# Patient Record
Sex: MALE | Race: OTHER
[De-identification: names, ages, dates, MRNs, and addresses within clinical notes are randomized per-mention and may not be internally consistent; named-entity substitution may affect disease eponyms.]

---

## 2020-04-13 PROBLEM — Z00.00 ENCOUNTER FOR PREVENTIVE HEALTH EXAMINATION: Status: ACTIVE | Noted: 2020-04-13

## 2020-04-14 ENCOUNTER — APPOINTMENT (OUTPATIENT)
Dept: ORTHOPEDIC SURGERY | Facility: CLINIC | Age: 45
End: 2020-04-14
Payer: MEDICAID

## 2020-04-14 VITALS
HEIGHT: 66 IN | BODY MASS INDEX: 28.93 KG/M2 | SYSTOLIC BLOOD PRESSURE: 127 MMHG | DIASTOLIC BLOOD PRESSURE: 77 MMHG | WEIGHT: 180 LBS | HEART RATE: 82 BPM

## 2020-04-14 PROCEDURE — 99203 OFFICE O/P NEW LOW 30 MIN: CPT

## 2020-04-14 NOTE — PHYSICAL EXAM
[de-identified] : General Exam\par Well developed, well nourished\par No apparent distress\par Oriented to person, place, and time\par Mood: Normal\par Affect: Normal\par Balance and coordination: Normal\par Gait: antalgic left\par \par left ankle and calf exam\par Skin: Clean, dry, intact\par Inspection: No obvious malalignment, + ecchymosis in posterior calf\par Tenderness: No tenderness over the lateral malleolus, medial malleolus, CFL/ATFL/PTFL, deltoid ligament. No tenderness proximal fibula. No tenderness about heel, no pain with heel squeeze. +ttp gastroc muscle belly\par ROM: df 20 pf 40 normal subtalar motion. +pf w calf squeeze\par Stability: Negative anterior/posterior drawer.\par Additional tests: Negative Mortons compression test, Negative syndesmosis squeeze test.\par Strength: 5/5 TA/GS/EHL\par Sensation: Intact to light touch in dp/sp/tib/matty/saph distributions\par Pulses: 2+ DP/PT pulses \par \par \par

## 2020-04-14 NOTE — DISCUSSION/SUMMARY
[de-identified] : Gastrocnemius muscle belly injury. Ice elevation ankle range of motion exercises. Weightbearing as tolerated. Given cam boot for comfort. All questions answered spinous translationhim good for comfort. Follow up 2 weeks. All questions answered with [place] translation

## 2020-04-14 NOTE — HISTORY OF PRESENT ILLNESS
[de-identified] : 44 y.o M presents to the office after injuring his L calf on Friday 4/10. He states he was trying to push something heavy with his body weight and felt a snap/pop in his L posterior calf. Presents to office walking to limp. Has not done anything to treat this injury at this time. Pain and difficulty with ambulation.\par \par The patient's past medical history, past surgical history, medications, allergies, and social history were reviewed by me today with the patient and documented accordingly. In addition, the patient's family history, which is noncontributory to this visit, was also reviewed.

## 2020-04-28 ENCOUNTER — APPOINTMENT (OUTPATIENT)
Dept: ORTHOPEDIC SURGERY | Facility: CLINIC | Age: 45
End: 2020-04-28
Payer: MEDICAID

## 2020-04-28 DIAGNOSIS — S86.112A STRAIN OF OTHER MUSCLE(S) AND TENDON(S) OF POSTERIOR MUSCLE GROUP AT LOWER LEG LEVEL, LEFT LEG, INITIAL ENCOUNTER: ICD-10-CM

## 2020-04-28 PROCEDURE — 99213 OFFICE O/P EST LOW 20 MIN: CPT

## 2020-04-28 RX ORDER — CHOLECALCIFEROL (VITAMIN D3) 1250 MCG
1.25 MG CAPSULE ORAL
Qty: 4 | Refills: 0 | Status: ACTIVE | COMMUNITY
Start: 2020-04-14

## 2020-04-28 NOTE — HISTORY OF PRESENT ILLNESS
[de-identified] : 44 y.o M presents to the office after injuring his L calf on Friday 4/10. He states he was trying to push something heavy with his body weight and felt a snap/pop in his L posterior calf. Presents to office walking to limp. Has not done anything to treat this injury at this time. Pain and difficulty with ambulation.\par \par Since last week he is out of his boot.  reports mild calf discomfort

## 2020-04-28 NOTE — DISCUSSION/SUMMARY
[de-identified] : Significant improvement in his calf pain gastrocnemius strain healing as expected weightbearing as tolerated he start light activity and return as tolerated over the next 2-4 weeks he may follow up as needed. All questions answered